# Patient Record
Sex: FEMALE | Race: WHITE | NOT HISPANIC OR LATINO
[De-identification: names, ages, dates, MRNs, and addresses within clinical notes are randomized per-mention and may not be internally consistent; named-entity substitution may affect disease eponyms.]

---
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
## 2021-01-12 ENCOUNTER — TRANSCRIPTION ENCOUNTER (OUTPATIENT)
Age: 21
End: 2021-01-12

## 2021-04-21 ENCOUNTER — TRANSCRIPTION ENCOUNTER (OUTPATIENT)
Age: 21
End: 2021-04-21

## 2021-09-12 ENCOUNTER — TRANSCRIPTION ENCOUNTER (OUTPATIENT)
Age: 21
End: 2021-09-12

## 2021-10-05 ENCOUNTER — TRANSCRIPTION ENCOUNTER (OUTPATIENT)
Age: 21
End: 2021-10-05

## 2021-10-12 ENCOUNTER — APPOINTMENT (OUTPATIENT)
Dept: PEDIATRICS | Facility: CLINIC | Age: 21
End: 2021-10-12
Payer: COMMERCIAL

## 2021-10-12 VITALS — OXYGEN SATURATION: 99 % | HEART RATE: 88 BPM

## 2021-10-12 VITALS — SYSTOLIC BLOOD PRESSURE: 122 MMHG | TEMPERATURE: 97.7 F | WEIGHT: 202 LBS | DIASTOLIC BLOOD PRESSURE: 84 MMHG

## 2021-10-12 LAB — HEMOGLOBIN: 13.5

## 2021-10-12 PROCEDURE — 99214 OFFICE O/P EST MOD 30 MIN: CPT | Mod: 25

## 2021-10-12 PROCEDURE — 85018 HEMOGLOBIN: CPT | Mod: QW

## 2021-10-12 NOTE — DISCUSSION/SUMMARY
[FreeTextEntry1] : Recommend antibiotics, nasal saline, and mucinex. Return if symptoms worsen or persist.\par If symptoms not improving in 3 days to follow up with neuro and get MRI.

## 2021-10-12 NOTE — PHYSICAL EXAM
[Inflamed Nasal Mucosa] : inflamed nasal mucosa [NL] : warm [FreeTextEntry4] : tender along frontal and maxillary sinuses [de-identified] : neg rhomberg, no nystagmus and balance on 1 foot WNL

## 2021-10-12 NOTE — HISTORY OF PRESENT ILLNESS
[de-identified] : feeling dizzy and off balance x1 mo, was seen at [facility] last week covid neg- fluid in ears and sinuses trial of flonase and getting worse afebrile  [FreeTextEntry6] : Pt has a h/o Lyme 2 years ago, was evaluated by Cardio and was cleared. \par started wit hdizziness several weeks ago which has exacerbated over the last 3 weeks\par Pt c/o congestion\par denies any headahces\par no trauma, no accidents or head inuries.  She does play basketball but doesn't recall a specific hit to her head in the week prior to her dizziness. \par She has a long h/o sinus infections per mom\par She is taking zyrtec and flonase at this time

## 2021-11-06 ENCOUNTER — APPOINTMENT (OUTPATIENT)
Dept: PEDIATRICS | Facility: CLINIC | Age: 21
End: 2021-11-06

## 2021-11-10 ENCOUNTER — APPOINTMENT (OUTPATIENT)
Dept: PEDIATRICS | Facility: CLINIC | Age: 21
End: 2021-11-10
Payer: COMMERCIAL

## 2021-11-10 VITALS — TEMPERATURE: 97.6 F

## 2021-11-10 DIAGNOSIS — R42 DIZZINESS AND GIDDINESS: ICD-10-CM

## 2021-11-10 DIAGNOSIS — Z71.89 OTHER SPECIFIED COUNSELING: ICD-10-CM

## 2021-11-10 DIAGNOSIS — J01.90 ACUTE SINUSITIS, UNSPECIFIED: ICD-10-CM

## 2021-11-10 DIAGNOSIS — Z20.822 CONTACT WITH AND (SUSPECTED) EXPOSURE TO COVID-19: ICD-10-CM

## 2021-11-10 LAB — SARS-COV-2 AG RESP QL IA.RAPID: NEGATIVE

## 2021-11-10 PROCEDURE — 87811 SARS-COV-2 COVID19 W/OPTIC: CPT | Mod: QW

## 2021-11-10 PROCEDURE — 99214 OFFICE O/P EST MOD 30 MIN: CPT | Mod: 25

## 2021-11-10 RX ORDER — CLINDAMYCIN PHOSPHATE 10 MG/ML
1 SOLUTION TOPICAL
Qty: 60 | Refills: 0 | Status: ACTIVE | COMMUNITY
Start: 2021-06-24

## 2021-11-10 RX ORDER — TRETINOIN 0.1 MG/G
0.01 GEL TOPICAL
Qty: 45 | Refills: 0 | Status: ACTIVE | COMMUNITY
Start: 2021-09-03

## 2021-11-10 NOTE — HISTORY OF PRESENT ILLNESS
[de-identified] : finished antibiotics about a week ago for sinus infection pt did feel better but now feels symptoms returning  [FreeTextEntry6] : Facial pain on and off x 2 days\par Dizziness started today, feels the same way as last time\par No fever or temp > 100\par No ear pain\par No sore throat\par No cough, wheezing or dyspnea\par No nasal congestion\par Normal appetite, No vomiting, No diarrhea\par No body aches or HA\par No smell or taste issues\par No sick contacts\par No Covid contacts or exposure\par No recent travel or contact with travelers\par Covid vaccinated

## 2021-11-10 NOTE — DISCUSSION/SUMMARY
[FreeTextEntry1] : Start medication(s) as prescribed\par Symptomatic treatment advised\par Covid Rapid done\par Discussed covid, quarantine protocol, control measures\par Maintain adequate hydration \par Cool mist humidifier\par Saline nose drops and sinus rinses\par Stressed handwashing and infection control \par Pay close observation for new or worsening symptoms\par Instructed to return to office if condition worsens or new symptoms arise\par Go to ER or UC if condition worsens or unable to to get to the office or after office hours\par

## 2021-11-10 NOTE — REVIEW OF SYSTEMS
[Nasal Discharge] : nasal discharge [Nasal Congestion] : nasal congestion [Dizziness] : dizziness [Negative] : Genitourinary [Fever] : no fever

## 2022-05-13 ENCOUNTER — NON-APPOINTMENT (OUTPATIENT)
Age: 22
End: 2022-05-13

## 2022-06-02 ENCOUNTER — NON-APPOINTMENT (OUTPATIENT)
Age: 22
End: 2022-06-02

## 2022-08-16 ENCOUNTER — NON-APPOINTMENT (OUTPATIENT)
Age: 22
End: 2022-08-16

## 2023-09-03 ENCOUNTER — NON-APPOINTMENT (OUTPATIENT)
Age: 23
End: 2023-09-03

## 2023-10-02 ENCOUNTER — NON-APPOINTMENT (OUTPATIENT)
Age: 23
End: 2023-10-02

## 2023-10-10 ENCOUNTER — APPOINTMENT (OUTPATIENT)
Dept: NEUROLOGY | Facility: CLINIC | Age: 23
End: 2023-10-10
Payer: COMMERCIAL

## 2023-10-10 VITALS
HEIGHT: 67 IN | SYSTOLIC BLOOD PRESSURE: 108 MMHG | DIASTOLIC BLOOD PRESSURE: 70 MMHG | BODY MASS INDEX: 30.61 KG/M2 | WEIGHT: 195 LBS

## 2023-10-10 DIAGNOSIS — R42 DIZZINESS AND GIDDINESS: ICD-10-CM

## 2023-10-10 DIAGNOSIS — Z82.0 FAMILY HISTORY OF EPILEPSY AND OTHER DISEASES OF THE NERVOUS SYSTEM: ICD-10-CM

## 2023-10-10 PROCEDURE — 99204 OFFICE O/P NEW MOD 45 MIN: CPT

## 2023-10-10 RX ORDER — CEFDINIR 250 MG/5ML
250 POWDER, FOR SUSPENSION ORAL DAILY
Qty: 2 | Refills: 0 | Status: DISCONTINUED | COMMUNITY
Start: 2021-10-12 | End: 2023-10-10

## 2023-10-10 RX ORDER — NORETHINDRONE ACETATE AND ETHINYL ESTRADIOL, ETHINYL ESTRADIOL AND FERROUS FUMARATE 1MG-10(24)
1 MG-10 MCG / KIT ORAL
Qty: 84 | Refills: 0 | Status: DISCONTINUED | COMMUNITY
Start: 2019-08-13 | End: 2023-10-10

## 2023-10-10 RX ORDER — FLUTICASONE PROPIONATE 50 UG/1
50 SPRAY, METERED NASAL DAILY
Qty: 1 | Refills: 3 | Status: DISCONTINUED | COMMUNITY
Start: 2020-11-02 | End: 2023-10-10

## 2025-01-01 ENCOUNTER — NON-APPOINTMENT (OUTPATIENT)
Age: 25
End: 2025-01-01

## 2025-03-01 ENCOUNTER — NON-APPOINTMENT (OUTPATIENT)
Age: 25
End: 2025-03-01